# Patient Record
(demographics unavailable — no encounter records)

---

## 2024-10-15 NOTE — PHYSICAL EXAM
[General Appearance - Alert] : alert [General Appearance - In No Acute Distress] : in no acute distress [General Appearance - Well-Appearing] : healthy appearing [] : normal voice and communication [Oriented To Time, Place, And Person] : oriented to person, place, and time [Affect] : the affect was normal [Mood] : the mood was normal [Memory Recent] : recent memory was not impaired [Memory Remote] : remote memory was not impaired [Sclera] : the sclera and conjunctiva were normal

## 2024-10-15 NOTE — ASSESSMENT
[FreeTextEntry1] : Migraine - pt responding well to current medications  No changes today   RTO 6 months   Call or use portal prn.

## 2024-10-15 NOTE — HISTORY OF PRESENT ILLNESS
[FreeTextEntry1] : Pt returns today for a follow up appt. for migraine .  He reports Atacand has been helpful to decrease migraine frequency and severity.  Rizatriptan is effective to abort migraine.  Pt denies adverse effects to both medications.  Overall health has been stable.  Pt denies any new migraine symptoms.  [Headache] : headache